# Patient Record
Sex: MALE | URBAN - NONMETROPOLITAN AREA
[De-identification: names, ages, dates, MRNs, and addresses within clinical notes are randomized per-mention and may not be internally consistent; named-entity substitution may affect disease eponyms.]

---

## 2024-07-02 ENCOUNTER — APPOINTMENT (RX ONLY)
Age: 49
Setting detail: DERMATOLOGY
End: 2024-07-02

## 2024-07-02 ENCOUNTER — RX ONLY (OUTPATIENT)
Age: 49
Setting detail: RX ONLY
End: 2024-07-02

## 2024-07-02 DIAGNOSIS — L28.1 PRURIGO NODULARIS: ICD-10-CM

## 2024-07-02 DIAGNOSIS — L81.4 OTHER MELANIN HYPERPIGMENTATION: ICD-10-CM

## 2024-07-02 DIAGNOSIS — D49.2 NEOPLASM OF UNSPECIFIED BEHAVIOR OF BONE, SOFT TISSUE, AND SKIN: ICD-10-CM

## 2024-07-02 DIAGNOSIS — D22 MELANOCYTIC NEVI: ICD-10-CM

## 2024-07-02 DIAGNOSIS — D18.0 HEMANGIOMA: ICD-10-CM

## 2024-07-02 DIAGNOSIS — L30.9 DERMATITIS, UNSPECIFIED: ICD-10-CM

## 2024-07-02 PROBLEM — D18.01 HEMANGIOMA OF SKIN AND SUBCUTANEOUS TISSUE: Status: ACTIVE | Noted: 2024-07-02

## 2024-07-02 PROBLEM — D22.5 MELANOCYTIC NEVI OF TRUNK: Status: ACTIVE | Noted: 2024-07-02

## 2024-07-02 PROCEDURE — ? COUNSELING

## 2024-07-02 PROCEDURE — ? PRESCRIPTION MEDICATION MANAGEMENT

## 2024-07-02 PROCEDURE — ? PRESCRIPTION

## 2024-07-02 PROCEDURE — ? BIOPSY BY SHAVE METHOD

## 2024-07-02 PROCEDURE — 99214 OFFICE O/P EST MOD 30 MIN: CPT | Mod: 25

## 2024-07-02 PROCEDURE — 11900 INJECT SKIN LESIONS </W 7: CPT

## 2024-07-02 PROCEDURE — 11102 TANGNTL BX SKIN SINGLE LES: CPT

## 2024-07-02 PROCEDURE — 11103 TANGNTL BX SKIN EA SEP/ADDL: CPT

## 2024-07-02 PROCEDURE — ? INTRALESIONAL KENALOG

## 2024-07-02 RX ORDER — TRIAMCINOLONE ACETONIDE 1 MG/G
CREAM TOPICAL BID
Qty: 80 | Refills: 0 | Status: ERX | COMMUNITY
Start: 2024-07-02

## 2024-07-02 RX ORDER — MUPIROCIN 20 MG/G
OINTMENT TOPICAL
Qty: 22 | Refills: 0 | Status: ERX | COMMUNITY
Start: 2024-07-02

## 2024-07-02 RX ADMIN — TRIAMCINOLONE ACETONIDE: 1 CREAM TOPICAL at 00:00

## 2024-07-02 ASSESSMENT — ITCH NUMERIC RATING SCALE: HOW SEVERE IS YOUR ITCHING?: 4

## 2024-07-02 ASSESSMENT — LOCATION ZONE DERM
LOCATION ZONE: FACE
LOCATION ZONE: FEET
LOCATION ZONE: LEG
LOCATION ZONE: TRUNK

## 2024-07-02 ASSESSMENT — LOCATION SIMPLE DESCRIPTION DERM
LOCATION SIMPLE: LEFT FOOT
LOCATION SIMPLE: LEFT POSTERIOR THIGH
LOCATION SIMPLE: LEFT UPPER BACK
LOCATION SIMPLE: RIGHT FOOT
LOCATION SIMPLE: CHEST
LOCATION SIMPLE: LEFT FOREHEAD

## 2024-07-02 ASSESSMENT — LOCATION DETAILED DESCRIPTION DERM
LOCATION DETAILED: LEFT DISTAL POSTERIOR THIGH
LOCATION DETAILED: LEFT MEDIAL SUPERIOR CHEST
LOCATION DETAILED: LEFT INFERIOR LATERAL FOREHEAD
LOCATION DETAILED: RIGHT DORSAL FOOT
LOCATION DETAILED: LEFT DORSAL FOOT
LOCATION DETAILED: RIGHT LATERAL MALLEOLUS
LOCATION DETAILED: RIGHT MEDIAL INFERIOR CHEST
LOCATION DETAILED: LEFT SUPERIOR UPPER BACK

## 2024-07-02 ASSESSMENT — SEVERITY ASSESSMENT: SEVERITY: MODERATE

## 2024-07-02 ASSESSMENT — BSA RASH: BSA RASH: 5

## 2024-07-02 NOTE — PROCEDURE: INTRALESIONAL KENALOG
How Many Mls Were Removed From The 80 Mg/Ml (5ml) Vial When Preparing The Injectable Solution?: 0
Detail Level: Detailed
Kenalog Type Of Vial: Multiple Dose
Validate Note Data When Using Inventory: Yes
Medical Necessity Clause: This procedure was medically necessary because the lesions that were treated were:
Show Inventory Tab: Hide
Bill For Wasted Drug (Kenalog)?: no
Consent: The risks of atrophy were reviewed with the patient.
Concentration Of Kenalog Solution Injected (Mg/Ml): 20.0
Treatment Number (Optional): 2
Kenalog Preparation: Kenalog

## 2024-07-02 NOTE — PROCEDURE: BIOPSY BY SHAVE METHOD
Detail Level: Detailed
Depth Of Biopsy: dermis
Was A Bandage Applied: Yes
Size Of Lesion In Cm: 0
Biopsy Type: H and E
Biopsy Method: 15 blade
Anesthesia Type: 1% lidocaine with epinephrine
Anesthesia Volume In Cc: 0.5
Hemostasis: Drysol
Wound Care: Petrolatum
Dressing: bandage
Destruction After The Procedure: No
Type Of Destruction Used: Curettage
Curettage Text: The wound bed was treated with curettage after the biopsy was performed.
Cryotherapy Text: The wound bed was treated with cryotherapy after the biopsy was performed.
Electrodesiccation Text: The wound bed was treated with electrodesiccation after the biopsy was performed.
Electrodesiccation And Curettage Text: The wound bed was treated with electrodesiccation and curettage after the biopsy was performed.
Silver Nitrate Text: The wound bed was treated with silver nitrate after the biopsy was performed.
Lab: [id number]
Consent: Written consent was obtained and risks were reviewed including but not limited to scarring, infection, bleeding, scabbing, incomplete removal, nerve damage and allergy to anesthesia.
Post-Care Instructions: I reviewed with the patient in detail post-care instructions. Patient is to keep the biopsy site dry overnight, and then apply bacitracin twice daily until healed. Patient may apply hydrogen peroxide soaks to remove any crusting.
Notification Instructions: Patient will be notified of biopsy results. However, patient instructed to call the office if not contacted within 2 weeks.
Billing Type: Third-Party Bill
Information: Selecting Yes will display possible errors in your note based on the variables you have selected. This validation is only offered as a suggestion for you. PLEASE NOTE THAT THE VALIDATION TEXT WILL BE REMOVED WHEN YOU FINALIZE YOUR NOTE. IF YOU WANT TO FAX A PRELIMINARY NOTE YOU WILL NEED TO TOGGLE THIS TO 'NO' IF YOU DO NOT WANT IT IN YOUR FAXED NOTE.

## 2024-08-13 ENCOUNTER — APPOINTMENT (RX ONLY)
Age: 49
Setting detail: DERMATOLOGY
End: 2024-08-13

## 2024-08-13 DIAGNOSIS — L81.4 OTHER MELANIN HYPERPIGMENTATION: ICD-10-CM

## 2024-08-13 DIAGNOSIS — D18.0 HEMANGIOMA: ICD-10-CM

## 2024-08-13 DIAGNOSIS — L30.9 DERMATITIS, UNSPECIFIED: ICD-10-CM

## 2024-08-13 DIAGNOSIS — D22 MELANOCYTIC NEVI: ICD-10-CM

## 2024-08-13 DIAGNOSIS — L28.1 PRURIGO NODULARIS: ICD-10-CM

## 2024-08-13 DIAGNOSIS — D49.2 NEOPLASM OF UNSPECIFIED BEHAVIOR OF BONE, SOFT TISSUE, AND SKIN: ICD-10-CM

## 2024-08-13 PROBLEM — D18.01 HEMANGIOMA OF SKIN AND SUBCUTANEOUS TISSUE: Status: ACTIVE | Noted: 2024-08-13

## 2024-08-13 PROBLEM — D22.5 MELANOCYTIC NEVI OF TRUNK: Status: ACTIVE | Noted: 2024-08-13

## 2024-08-13 PROCEDURE — ? COUNSELING

## 2024-08-13 PROCEDURE — 99214 OFFICE O/P EST MOD 30 MIN: CPT | Mod: 25

## 2024-08-13 PROCEDURE — ? PRESCRIPTION MEDICATION MANAGEMENT

## 2024-08-13 PROCEDURE — ? DEFER

## 2024-08-13 PROCEDURE — ? INTRALESIONAL KENALOG

## 2024-08-13 PROCEDURE — ? PRESCRIPTION

## 2024-08-13 PROCEDURE — 11900 INJECT SKIN LESIONS </W 7: CPT

## 2024-08-13 RX ORDER — PIMECROLIMUS 10 MG/G
CREAM TOPICAL
Qty: 30 | Refills: 3 | Status: ERX | COMMUNITY
Start: 2024-08-13

## 2024-08-13 RX ADMIN — PIMECROLIMUS: 10 CREAM TOPICAL at 00:00

## 2024-08-13 ASSESSMENT — LOCATION ZONE DERM
LOCATION ZONE: LEG
LOCATION ZONE: TRUNK
LOCATION ZONE: FEET

## 2024-08-13 ASSESSMENT — LOCATION SIMPLE DESCRIPTION DERM
LOCATION SIMPLE: LEFT UPPER BACK
LOCATION SIMPLE: LEFT FOOT
LOCATION SIMPLE: LEFT POSTERIOR THIGH
LOCATION SIMPLE: CHEST
LOCATION SIMPLE: LEFT LOWER BACK
LOCATION SIMPLE: RIGHT UPPER BACK
LOCATION SIMPLE: RIGHT LOWER BACK

## 2024-08-13 ASSESSMENT — LOCATION DETAILED DESCRIPTION DERM
LOCATION DETAILED: RIGHT MEDIAL SUPERIOR CHEST
LOCATION DETAILED: RIGHT SUPERIOR LATERAL MIDBACK
LOCATION DETAILED: LEFT INFERIOR MEDIAL MIDBACK
LOCATION DETAILED: LEFT DORSAL FOOT
LOCATION DETAILED: LEFT INFERIOR UPPER BACK
LOCATION DETAILED: LEFT SUPERIOR UPPER BACK
LOCATION DETAILED: RIGHT INFERIOR UPPER BACK
LOCATION DETAILED: RIGHT SUPERIOR UPPER BACK
LOCATION DETAILED: LEFT DISTAL POSTERIOR THIGH
LOCATION DETAILED: LEFT MEDIAL SUPERIOR CHEST

## 2024-08-13 NOTE — PROCEDURE: COUNSELING
Patient Specific Counseling (Will Not Stick From Patient To Patient): Pt reports having rash on feet\\nPt currently tx via TAC w/ improvement
Detail Level: Zone
Detail Level: Generalized
Patient Specific Counseling (Will Not Stick From Patient To Patient): Advised pt to NOT pick
Detail Level: Detailed

## 2024-08-13 NOTE — PROCEDURE: PRESCRIPTION MEDICATION MANAGEMENT
Render In Strict Bullet Format?: No
Detail Level: Zone
Plan: Cont TAC 0.1% crm BID aaa x 2 weeks (ONLY FOR FLARES)\\nStart Elidel 1% crm BID aaa x 3 months

## 2024-08-13 NOTE — PROCEDURE: INTRALESIONAL KENALOG
Treatment Number (Optional): 5
Kenalog Preparation: Kenalog
How Many Mls Were Removed From The 40 Mg/Ml (5ml) Vial When Preparing The Injectable Solution?: 0
Validate Note Data When Using Inventory: Yes
Total Volume (Ccs): 2
Medical Necessity Clause: This procedure was medically necessary because the lesions that were treated were:
Detail Level: Detailed
Kenalog Type Of Vial: Multiple Dose
Consent: The risks of atrophy were reviewed with the patient.
Require Ndc Code?: No
Concentration Of Kenalog Solution Injected (Mg/Ml): 5.0
Show Inventory Tab: Hide